# Patient Record
(demographics unavailable — no encounter records)

---

## 2017-04-18 NOTE — ERD
ER Documentation


Chief Complaint


Date/Time


DATE: 4/18/17 


TIME: 23:11


Chief Complaint


sore throat x 4 days





HPI


Patient is a 41-year-old female who presents to the ED with sore throat, cough, 

congestion 4 days.  She denies fever or chills.  She states that she has taken 

Tylenol for her symptoms which is helped minimally.  She denies headache or 

dizziness.  Denies body aches.  Denies chest pain, shortness of breath or 

difficulty breathing.  Denies leg pain or swelling.  Denies abdominal pain, 

nausea, vomiting or diarrhea.  Denies sick contacts.  No other complaints.





ROS


All systems reviewed and are negative except as per history of present illness.





Medications


Home Meds


Active Scripts


Benzonatate* (Tessalon Perle*) 100 Mg Capsule, 100 MG PO Q8H Y for COUGH for 14 

Days, CAP


   Prov:AUBREE MCDONALD PA-C         4/18/17


Acetaminophen* (Tylophen*) 500 Mg Capsule, 1 CAP PO Q6H Y for PAIN AND OR 

ELEVATED TEMP, #20 CAP


   Prov:AUBREE MCDONALD PA-C         4/18/17


Azithromycin* (Zithromax*) 250 Mg Tablet, 250 MG PO .ZPACK AS DIRECTED, #6 TAB


   TAKE 500 MG (2 TABS) THE FIRST DAY THEN 250 MG (1 TAB) DAYS 2-5


   Prov:AUBREE MCDONALD PA-C         4/18/17





Allergies


Allergies:  


Coded Allergies:  


     No Known Allergy (Unverified , 7/7/16)





PMhx/Soc


History of Surgery:  Yes (c/section x 1)


Anesthesia Reaction:  No


Hx Neurological Disorder:  No


Hx Respiratory Disorders:  No


Hx Cardiac Disorders:  Yes (htn)


Hx Psychiatric Problems:  No


Hx Miscellaneous Medical Probl:  No


Hx Alcohol Use:  No


Hx Substance Use:  No


Hx Tobacco Use:  No





FmHx


Family History:  No coronary disease, No diabetes, No other





Physical Exam


Vitals





Vital Signs








  Date Time  Temp Pulse Resp B/P Pulse Ox O2 Delivery O2 Flow Rate FiO2


 


4/18/17 19:35 98.7 90 20 144/80 100   








Physical Exam





GENERAL: Well-developed, well-nourished female. Appears in no acute distress. 


HEAD: Normocephalic, atraumatic. 


EYES: Pupils are equally reactive bilaterally. EOMs grossly intact. No 

conjunctival erythema. 


ENT: Moist mucous membranes. No uvula deviation. No kissing tonsils. No 

exudates.  Bilateral TMs are nonerythematous and nonbulging.


NECK: Supple. No lymphadenopathy or thyromegaly. No meningismus. negative 

kernig. negative brudinski.  


LUNG: Clear to auscultation bilaterally. No rhonchi, wheezing, rales or coarse 

breath sounds. 


HEART: Regular rate and rhythm. No murmurs, rubs or gallops.


NEUROLOGIC: Alert and oriented. Moving all four extremities. 5/5 strength in 

all extremities. Normal speech. Steady gait. 


SKIN: Normal color. Warm and dry. No rashes or lesions. Capillary refill < 2 

seconds





Procedures/MDM


ER COURSE:


I kept the patient and/or family informed of laboratory and diagnostic imaging 

results throughout the emergency room course.





MEDICAL DECISION MAKING:


This is a 41-year-old Female who presents with cough, congestion and sore 

throat 4 days.. Vital signs were reviewed. Patient is afebrile. Patient is not 

hypoxic.  Patient is not toxic or ill-appearing.  Patient likely has URI of 

viral versus bacterial etiology.  Due to the length of patient's symptoms that 

patient is requesting a stronger medication I will be giving the patient 

azithromycin.  I do not think a chest x-ray is warranted at this time as patient

's lung examination from the right limits and patient is afebrile.  Patient 

does not show signs of respiratory distress.  Low suspicion for pneumonia, PE, 

pneumothorax, ACS, epiglottitis, obstruction, TB, pertussis, meningitis, 

sepsis.  Low suspicion for peritonsillar abscess, strep pharyngitis, 

mononucleosis, dental abscess











DISCHARGE:


At this time, patient is stable for discharge and outpatient management with no 

new complaints during the ER course. Patient was sent home with azithromycin, 

Tessalon Perles for cough and Tylenol for pain. Patient will be discharged home 

with instructions to recheck for new or worsening symptoms such as fever, nausea

, weakness, LOC and to follow up with primary care in the next 1-2 days. 

Patient was advised to return to the ER for any new or worsening symptoms. Plan 

was discussed and patient and/or family understands and agrees. Home 

instructions were given.





Departure


Diagnosis:  


 Primary Impression:  


 URI, acute


Condition:  Stable


Patient Instructions:  Uri, Viral, No Abx (Adult)





Additional Instructions:  


Shanti alba doctor MAANA y manny gale SAM PARA DENTRO DE 1-2 CRAIG.Dgale a la 

secretaria que nosotros le instruimos hacer esta sam.Avise o llame si jamil 

condicin se empeora antes de la sam. Regresa aqui si peor o no mejor.











AUBREE MCDONALD PA-C Apr 18, 2017 23:13